# Patient Record
Sex: MALE | URBAN - NONMETROPOLITAN AREA
[De-identification: names, ages, dates, MRNs, and addresses within clinical notes are randomized per-mention and may not be internally consistent; named-entity substitution may affect disease eponyms.]

---

## 2023-09-11 ENCOUNTER — APPOINTMENT (OUTPATIENT)
Dept: URBAN - NONMETROPOLITAN AREA SURGERY 11 | Age: 63
Setting detail: DERMATOLOGY
End: 2023-09-11

## 2023-09-11 DIAGNOSIS — Z85.828 PERSONAL HISTORY OF OTHER MALIGNANT NEOPLASM OF SKIN: ICD-10-CM

## 2023-09-11 DIAGNOSIS — L82.0 INFLAMED SEBORRHEIC KERATOSIS: ICD-10-CM

## 2023-09-11 DIAGNOSIS — L57.8 OTHER SKIN CHANGES DUE TO CHRONIC EXPOSURE TO NONIONIZING RADIATION: ICD-10-CM

## 2023-09-11 DIAGNOSIS — D18.0 HEMANGIOMA: ICD-10-CM

## 2023-09-11 DIAGNOSIS — D49.2 NEOPLASM OF UNSPECIFIED BEHAVIOR OF BONE, SOFT TISSUE, AND SKIN: ICD-10-CM

## 2023-09-11 DIAGNOSIS — L57.0 ACTINIC KERATOSIS: ICD-10-CM

## 2023-09-11 PROBLEM — D18.01 HEMANGIOMA OF SKIN AND SUBCUTANEOUS TISSUE: Status: ACTIVE | Noted: 2023-09-11

## 2023-09-11 PROCEDURE — 17110 DESTRUCT B9 LESION 1-14: CPT

## 2023-09-11 PROCEDURE — OTHER PHOTODYNAMIC THERAPY COUNSELING: OTHER

## 2023-09-11 PROCEDURE — 11103 TANGNTL BX SKIN EA SEP/ADDL: CPT | Mod: 59

## 2023-09-11 PROCEDURE — OTHER REASSURANCE: OTHER

## 2023-09-11 PROCEDURE — 17000 DESTRUCT PREMALG LESION: CPT | Mod: 59

## 2023-09-11 PROCEDURE — OTHER COUNSELING: OTHER

## 2023-09-11 PROCEDURE — 11102 TANGNTL BX SKIN SINGLE LES: CPT | Mod: 59

## 2023-09-11 PROCEDURE — OTHER LIQUID NITROGEN: OTHER

## 2023-09-11 PROCEDURE — 99203 OFFICE O/P NEW LOW 30 MIN: CPT | Mod: 25

## 2023-09-11 PROCEDURE — 17003 DESTRUCT PREMALG LES 2-14: CPT | Mod: 59

## 2023-09-11 PROCEDURE — OTHER MIPS QUALITY: OTHER

## 2023-09-11 PROCEDURE — OTHER BIOPSY BY SHAVE METHOD: OTHER

## 2023-09-11 ASSESSMENT — LOCATION ZONE DERM
LOCATION ZONE: TRUNK
LOCATION ZONE: LEG
LOCATION ZONE: ARM
LOCATION ZONE: FACE
LOCATION ZONE: NECK

## 2023-09-11 ASSESSMENT — LOCATION DETAILED DESCRIPTION DERM
LOCATION DETAILED: RIGHT POSTERIOR SHOULDER
LOCATION DETAILED: SUPERIOR THORACIC SPINE
LOCATION DETAILED: LEFT CLAVICULAR NECK
LOCATION DETAILED: RIGHT CENTRAL MALAR CHEEK
LOCATION DETAILED: LEFT LATERAL UPPER BACK
LOCATION DETAILED: LEFT MID-UPPER BACK
LOCATION DETAILED: LEFT CENTRAL MALAR CHEEK
LOCATION DETAILED: RIGHT FOREHEAD
LOCATION DETAILED: INFERIOR THORACIC SPINE
LOCATION DETAILED: LEFT SUPERIOR UPPER BACK
LOCATION DETAILED: EPIGASTRIC SKIN
LOCATION DETAILED: LEFT CENTRAL LATERAL NECK
LOCATION DETAILED: RIGHT ANTERIOR PROXIMAL THIGH
LOCATION DETAILED: RIGHT LATERAL ABDOMEN

## 2023-09-11 ASSESSMENT — LOCATION SIMPLE DESCRIPTION DERM
LOCATION SIMPLE: LEFT CHEEK
LOCATION SIMPLE: RIGHT CHEEK
LOCATION SIMPLE: RIGHT FOREHEAD
LOCATION SIMPLE: RIGHT SHOULDER
LOCATION SIMPLE: NECK
LOCATION SIMPLE: LEFT UPPER BACK
LOCATION SIMPLE: UPPER BACK
LOCATION SIMPLE: RIGHT THIGH
LOCATION SIMPLE: LEFT ANTERIOR NECK
LOCATION SIMPLE: ABDOMEN

## 2023-09-11 NOTE — PROCEDURE: LIQUID NITROGEN
Medical Necessity Information: It is in your best interest to select a reason for this procedure from the list below. All of these items fulfill various CMS LCD requirements except the new and changing color options.
Aperture Size (Optional): A
Consent: The patient's consent was obtained including but not limited to risks of crusting, scabbing, blistering, scarring, darker or lighter pigmentary change, recurrence, incomplete removal and infection.
Medical Necessity Clause: This procedure was medically necessary because the lesions that were treated were:
Spray Paint Technique: No
Show Aperture Variable?: Yes
Duration Of Freeze Thaw-Cycle (Seconds): 5-10
Number Of Freeze-Thaw Cycles: 1 freeze-thaw cycle
Spray Paint Text: The liquid nitrogen was applied to the skin utilizing a spray paint frosting technique.
Detail Level: Detailed
Post-Care Instructions: I reviewed with the patient in detail post-care instructions. Patient is to wear sunprotection, and avoid picking at any of the treated lesions. Pt may apply Vaseline to crusted or scabbing areas.
Application Tool (Optional): Liquid Nitrogen Sprayer
Duration Of Freeze Thaw-Cycle (Seconds): 5
Aperture Size (Optional): C

## 2023-10-03 ENCOUNTER — APPOINTMENT (OUTPATIENT)
Dept: URBAN - NONMETROPOLITAN AREA SURGERY 11 | Age: 63
Setting detail: DERMATOLOGY
End: 2023-10-03

## 2023-10-03 PROBLEM — D04.4 CARCINOMA IN SITU OF SKIN OF SCALP AND NECK: Status: ACTIVE | Noted: 2023-10-03

## 2023-10-03 PROCEDURE — OTHER REPAIR NOTE: OTHER

## 2023-10-03 PROCEDURE — 17311 MOHS 1 STAGE H/N/HF/G: CPT

## 2023-10-03 PROCEDURE — OTHER PRESCRIPTION: OTHER

## 2023-10-03 PROCEDURE — OTHER MOHS SURGERY: OTHER

## 2023-10-03 PROCEDURE — 13132 CMPLX RPR F/C/C/M/N/AX/G/H/F: CPT

## 2023-10-03 RX ORDER — CEPHALEXIN 500 MG/1
CAPSULE ORAL BID
Qty: 28 | Refills: 0 | Status: ERX | COMMUNITY
Start: 2023-10-03

## 2023-10-03 RX ORDER — MUPIROCIN 20 MG/G
OINTMENT TOPICAL BID
Qty: 22 | Refills: 2 | Status: ERX | COMMUNITY
Start: 2023-10-03

## 2023-10-03 NOTE — PROCEDURE: MOHS SURGERY
Unknown Star Wedge Flap Text: The defect edges were debeveled with a #15 scalpel blade. Given the location of the defect, shape of the defect and the proximity to free margins a star wedge flap was deemed most appropriate. Using a sterile surgical marker, an appropriate rotation flap was drawn incorporating the defect and placing the expected incisions within the relaxed skin tension lines where possible. The area thus outlined was incised deep to adipose tissue with a #15 scalpel blade. The skin margins were undermined to an appropriate distance in all directions utilizing iris scissors. Following this, the designed flap was carried over into the primary defect and sutured into place.

## 2023-10-03 NOTE — PROCEDURE: REPAIR NOTE
Patient Summary (HL7 CCD)

 Created on: 2014



ALEXISELITAMMIE STALEY

External Reference #: [id number]

: 1931

Sex: Male



Demographics







 Address  [address] [place] [address]  [address]

 

 Home Phone  [phone]

 

 Preferred Language  English

 

 Marital Status  Unknown

 

 Buddhist Affiliation  Unknown

 

 Race  Unknown

 

 Ethnic Group  Unknown





Author







 NICK Diggs

 

 Organization  Unknown

 

 Address  [address] [place] [address]  [id number]



 

 Phone  0







Care Team Providers







 Care Team Member Name  Role  Phone

 

 HAN ROSS  Attending Physician  0



                                            



Vital Signs

          Unknown or Not Available.                                            
                        



Allergies

                      





 Allergy                    Code                    Allergy Type               
     Reaction                    Status                

 

 PCN (penicillin)                    0                    Drug allergy         
                                Active                

 

 SULFA (sulfonamide)                    0                    Drug allergy      
                                   Active                



                                                                               
                   



Procedures

          Unknown or Not Available.                                            
                                  



History of Immunizations

          Unknown or Not Available.                                            
                                  



Problems

          Unknown or Not Available.                                            
                                            



Results

                      

            





 COMP METABOLIC - Collect Date/Time: 2014 11:05                 

 

 Test Name                    Code                    Test Result              
      Test Units                    Test Ref Range                

 

 GLUCOSE                                         104                    mg/dL  
                  L=70       H=110                

 

 BUN                                         33                    mg/dL       
             L=7        H=18                

 

 CREATININE                                         2.10                    mg/
dL                    L=0.60     H=1.30                

 

 AGE                                         83                    YEARS       
                              

 

 GFR                                         32.4                              
                            

 

 SODIUM                                         143                    mmol/L  
                  L=136      H=145                

 

 POTASSIUM                                         4.9                    mmol/
L                    L=3.5      H=5.1                

 

 CHLORIDE                                         107                    mmol/L
                    L=98       H=107                

 

 CO2                                         28                    mmol/L      
              L=21       H=32                

 

 CALCIUM                                         9.2                    mg/dL  
                  L=8.5      H=10.1                

 

 AST                                         15                    U/L         
           L=15       H=37                

 

 ALT                                         24                    U/L         
           L=12       H=78                

 

 ALKALINE PHOS                                         85                    U/
L                    L=46       H=116                

 

 TOTAL PROTEIN                                         7.7                    g/
dL                    L=6.4      H=8.2                

 

 ALBUMIN                                         4.0                    g/dL   
                 L=3.4      H=5.0                

 

 TOTAL BILI                                         0.60                    mg/
dL                    L=0.00     H=1.00                



            

            





 PRO B-TYPE NATRIURETIC PEPTIDE - Collect Date/Time: 2014 11:05          
       

 

 Test Name                    Code                    Test Result              
      Test Units                    Test Ref Range                

 

 PBNP                                         101                    pg/mL     
               L=0        H=450                



            

            





 CBC (HEMOGRAM ONLY) - Collect Date/Time: 2014 11:05                 

 

 Test Name                    Code                    Test Result              
      Test Units                    Test Ref Range                

 

 WBC                                         7.9                    x10^3      
              L=4.8      H=10.8                

 

 RBC                                         4.18                    x10^6     
               L=4.70     H=6.10                

 

 HEMOGLOBIN                                         13.1                    g/
dL                    L=14.0     H=18.0                

 

 HEMATOCRIT                                         39.7                    %  
                  L=42.0     H=52.0                

 

 MCV                                         95                    fL          
          L=80       H=100                

 

 MCH                                         31.4                    pg        
            L=27.0     H=33.0                

 

 MCHC                                         33.1                    g/dL     
               L=33.0     H=37.0                

 

 RDW                                         12.5                    %         
           L=11.5     H=14.5                

 

 PLATELETS                                         181                    x10^3
                    L=150      H=450                

 

 MPV                                         9.3                    fL         
           L=7.8      H=11.0                



                                                                               
                                       



Medications

          Unknown or Not Available.                                            
                        



Medications Administered

          Unknown or Not Available.                                            
                        



Encounters

          Unknown or Not Available.                                            
              



Social History

                      





 Smoking Status                    Code                    Start Date          
          End Date                

 

 Former smoker                    [id number]                                      
                    



                                                                              



Patient Decision Aids

          Unknown or Not Available.                                            
              



Discharge Instructions

                      





                     



You were admitted to [facility] [place]note 2] on 2014.             
       



You were discharged from [facility]note 2[facility] on 2014.         
           



Should you have any questions prior to discharge, please contact a member of 
your healthcare team. If you have left the hospital and have any questions, 
please contact your primary care physician.                                  



                                                                    



Chief Complaint and Reason For Visit

                      





 Chief Complaint                    Date of Onset                

 

 LAB                                     



                                                                    



Function Status

          Unknown or Not Available.                                            
              



Plan of Care

          Unknown or Not Available.                                            
              



Referral/Transition of Care

          Unknown or Not Available. Composite Graft Text: The defect edges were debeveled with a #15 scalpel blade. Given the location of the defect, shape of the defect, the proximity to free margins and the fact the defect was full thickness a composite graft was deemed most appropriate.  The defect was outline and then transferred to the donor site.  A full thickness graft was then excised from the donor site. The graft was then placed in the primary defect, oriented appropriately and then sutured into place.  The secondary defect was then repaired using a primary closure.

## 2023-10-03 NOTE — PROCEDURE: MOHS SURGERY
0 Double Z Plasty Text: The lesion was extirpated to the level of the fat with a #15 scalpel blade. Given the location of the defect, shape of the defect and the proximity to free margins a double Z-plasty was deemed most appropriate for repair. Using a sterile surgical marker, the appropriate transposition arms of the double Z-plasty were drawn incorporating the defect and placing the expected incisions within the relaxed skin tension lines where possible. The area thus outlined was incised deep to adipose tissue with a #15 scalpel blade. The skin margins were undermined to an appropriate distance in all directions utilizing iris scissors. The opposing transposition arms were then transposed and carried over into place in opposite direction and anchored with interrupted buried subcutaneous sutures.

## 2023-10-03 NOTE — PROCEDURE: REPAIR NOTE
Lab Results   Component Value Date    FERRITIN 8 (L) 06/09/2022     Microcytic anemia with unequivocal evidence of iron deficiency.  Suspected source is chronic blood loss (oral).  -submit plan for IV injectafer that needs expedited auth and for him to start ASAP, especially with the chemotherapy he's about to receive.   Closure 3 Information: This tab is for additional flaps and grafts above and beyond our usual structured repairs.  Please note if you enter information here it will not currently bill and you will need to add the billing information manually.

## 2023-10-17 ENCOUNTER — APPOINTMENT (OUTPATIENT)
Dept: URBAN - NONMETROPOLITAN AREA SURGERY 11 | Age: 63
Setting detail: DERMATOLOGY
End: 2023-10-17

## 2023-10-17 DIAGNOSIS — Z48.02 ENCOUNTER FOR REMOVAL OF SUTURES: ICD-10-CM

## 2023-10-17 PROCEDURE — OTHER SUTURE REMOVAL (GLOBAL PERIOD): OTHER

## 2023-10-17 PROCEDURE — OTHER MIPS QUALITY: OTHER

## 2023-10-17 PROCEDURE — 99024 POSTOP FOLLOW-UP VISIT: CPT

## 2023-10-17 PROCEDURE — OTHER COUNSELING: OTHER

## 2023-10-17 ASSESSMENT — LOCATION SIMPLE DESCRIPTION DERM: LOCATION SIMPLE: LEFT ANTERIOR NECK

## 2023-10-17 ASSESSMENT — LOCATION ZONE DERM: LOCATION ZONE: NECK

## 2023-10-17 ASSESSMENT — LOCATION DETAILED DESCRIPTION DERM: LOCATION DETAILED: LEFT INFERIOR LATERAL NECK

## 2023-10-17 NOTE — PROCEDURE: SUTURE REMOVAL (GLOBAL PERIOD)
Detail Level: Detailed
Add 88072 Cpt? (Important Note: In 2017 The Use Of 22493 Is Being Tracked By Cms To Determine Future Global Period Reimbursement For Global Periods): no

## 2025-02-18 NOTE — PROCEDURE: REPAIR NOTE
at bedside talking with Dr Layton   Intermediate Repair And Flap Additional Text (Will Appearing After The Standard Complex Repair Text): The intermediate repair was not sufficient to completely close the primary defect. The remaining additional defect was repaired with the flap mentioned below.